# Patient Record
Sex: MALE | Race: WHITE | Employment: OTHER | URBAN - METROPOLITAN AREA
[De-identification: names, ages, dates, MRNs, and addresses within clinical notes are randomized per-mention and may not be internally consistent; named-entity substitution may affect disease eponyms.]

---

## 2017-08-14 ENCOUNTER — HOSPITAL ENCOUNTER (OUTPATIENT)
Dept: GENERAL RADIOLOGY | Age: 72
Discharge: HOME | End: 2017-08-14
Attending: INTERNAL MEDICINE | Admitting: INTERNAL MEDICINE

## 2017-08-14 DIAGNOSIS — R07.2 PRECORDIAL PAIN: ICD-10-CM

## 2022-08-03 ENCOUNTER — HOSPITAL ENCOUNTER (OUTPATIENT)
Dept: NON INVASIVE DIAGNOSTICS | Age: 77
Discharge: HOME | End: 2022-08-03
Payer: MEDICARE

## 2022-08-03 PROCEDURE — 93017 CV STRESS TEST TRACING ONLY: CPT

## 2023-08-02 ENCOUNTER — HOSPITAL ENCOUNTER (OUTPATIENT)
Age: 78
Discharge: HOME | End: 2023-08-02
Payer: MEDICARE

## 2023-08-02 LAB
ANION GAP SERPL CALCULATED.3IONS-SCNC: 16 MMOL/L (ref 4–16)
BUN SERPL-MCNC: 30 MG/DL (ref 6–23)
CALCIUM SERPL-MCNC: 10 MG/DL (ref 8.3–10.6)
CHLORIDE BLD-SCNC: 101 MMOL/L (ref 99–110)
CO2: 21 MMOL/L (ref 21–32)
CREAT SERPL-MCNC: 1.5 MG/DL (ref 0.9–1.3)
GFR SERPL CREATININE-BSD FRML MDRD: 48 ML/MIN/1.73M2
GLUCOSE SERPL-MCNC: 111 MG/DL (ref 70–99)
POTASSIUM SERPL-SCNC: 4.3 MMOL/L (ref 3.5–5.1)
SODIUM BLD-SCNC: 138 MMOL/L (ref 135–145)

## 2023-08-02 PROCEDURE — 36415 COLL VENOUS BLD VENIPUNCTURE: CPT

## 2023-08-02 PROCEDURE — 80048 BASIC METABOLIC PNL TOTAL CA: CPT

## 2024-05-20 ENCOUNTER — HOSPITAL ENCOUNTER (OUTPATIENT)
Dept: RADIATION ONCOLOGY | Age: 79
Discharge: HOME | End: 2024-05-20
Payer: MEDICARE

## 2024-05-20 VITALS
OXYGEN SATURATION: 97 % | HEART RATE: 72 BPM | HEIGHT: 70 IN | SYSTOLIC BLOOD PRESSURE: 139 MMHG | DIASTOLIC BLOOD PRESSURE: 82 MMHG | BODY MASS INDEX: 36.28 KG/M2 | WEIGHT: 253.4 LBS | TEMPERATURE: 98.4 F

## 2024-05-20 DIAGNOSIS — C44.629: Primary | ICD-10-CM

## 2024-05-20 PROCEDURE — 99202 OFFICE O/P NEW SF 15 MIN: CPT | Performed by: RADIOLOGY

## 2024-05-20 PROCEDURE — 99204 OFFICE O/P NEW MOD 45 MIN: CPT | Performed by: RADIOLOGY

## 2024-05-20 RX ORDER — LISINOPRIL 20 MG/1
20 TABLET ORAL DAILY
COMMUNITY
Start: 2023-07-17

## 2024-05-20 RX ORDER — OMEPRAZOLE 40 MG/1
40 CAPSULE, DELAYED RELEASE ORAL DAILY
COMMUNITY
Start: 2022-10-07

## 2024-05-20 RX ORDER — ROSUVASTATIN CALCIUM 40 MG/1
40 TABLET, COATED ORAL DAILY
COMMUNITY
Start: 2023-12-05

## 2024-05-20 ASSESSMENT — PAIN DESCRIPTION - LOCATION: LOCATION: FACE

## 2024-05-20 ASSESSMENT — PAIN SCALES - GENERAL: PAINLEVEL_OUTOF10: 6

## 2024-05-20 NOTE — PLAN OF CARE
Radiation education and handouts given. Side effects and management reviewed with pt. All questions answered and pt voices understanding .    Pt scheduled to return to [facility] for CT/SIM for radiation planning tomorrow 5/21/2024 at 1:00 PM, no prep ordered.     Nursing Care Plan for Skin Radiation    Pain related to cancer diagnosis and treatment.    Interventions   Pain assessment.   Monitor pharmacological pain medication.   Teach relaxation and repositioning techniques.     Expected Outcome   Maintain patient's acceptable pain level or <5 on pain scale.       Knowledge deficit related to diagnosis and treatment plan.    Interventions   Assess patient's ability to comprehend diagnosis and treatment plan.   Provide educational materials and teaching regarding plan of care.   Provide emotional support and continued education.   Refer to psychosocial coordinator if further assistance needed.     Expected Outcome   Patient voices understanding of diagnosis and treatment plan.       Altered skin integrity related to treatment.    Interventions   Evaluation of skin integrity at therapy site.   Advise patient on appropriate skin care.   Instruct patient on recommended lotions/ointments/creams/dressing changes to use on therapy site.     Expected Outcome   Minimize adverse skin reaction/infection at treatment site.       Altered musculo/skeletal/functional status.    Interventions   Assess range of motion and ability to perform ADL's.   Provide assistance when needed.   Referral for OT/PT evaluation and treat.     Expected Outcome   Patient to obtain/maintain highest potential functional level.     Nurse to evaluate weekly during radiation treatments.

## 2024-05-20 NOTE — CONSULTS
production  Cardiovascular:  The patient denies any chest pain, leg edema, or fainting spells  GI:  Patient denies nausea, vomiting, diarrhea, melena, or hematochezia  : Patient denies dysuria, hematuria, or urinary incontinence.  Integumentary: patient denies skin rashes, pruritis, or arm edema  Endocrine:  Patient denies any thyroid problems. Hx DM  Neurologic: Patient denies headaches, focal weakness, or disorientation  Psychiatric: The patient denies any depression, anxiety, or rapid mood swings.    PHYSICAL EXAMINATION:   VITAL SIGNS: /82   Pulse 72   Temp 98.4 °F (36.9 °C) (Infrared)   Ht 1.778 m (5' 10\")   Wt 114.9 kg (253 lb 6.4 oz)   SpO2 97%   BMI 36.36 kg/m²   ECOG PERFORMANCE STATUS: 0  PAIN RATIN    GENERAL: Pleasant well-developed adult in no acute distress.  Alert and oriented ×3  SKIN: Warm and dry, without jaundice, ecchymoses, or petechiae.  HEENT: Normocephalic, atraumatic.  Pupils are round and symmetric. Sclerae anicteric  NECK:  No JVD; Supple. No cervical, supraclavicular, or infraclavicular lymphadenopathy is palpable.  LUNGS: Bilaterally clear to auscultation.  No rales, rhonci, or wheezing are present. Good inspiratory effort, no accessory muscle use.   CARDIAC: Regular rate and rhythm, without murmurs, clicks, or gallops   ABDOMEN: Normoactive bowels sounds are present.  Soft. Non-tender and non-distended.  No hepatosplenomegaly or masses are present.  EXTREMITIES: No cyanosis, clubbing or edema is present.  FROM.  A 2 x 2.5 cm erythematous and scabbed lesion is present along the dorsum of the left index finger  NEUROLOGIC: Gait unremarkable. The patient is alert and oriented.  CN II-XII are grossly intact.  Sensation to light touch is intact and symmetric in the fingers and feet.  Muscle strength is 5/5 in both the upper and lower extremities.      IMPRESSION/PLAN:  Chepe Beyer is a 78 y.o. male who was recently found to have a clinically invasive squamous cell

## 2024-05-20 NOTE — PROGRESS NOTES
Chepe SWIFT Beyer  5/20/2024    CONSULT     Vitals:    05/20/24 0959   BP: 139/82   Pulse: 72   Temp: 98.4 °F (36.9 °C)   SpO2: 97%        Oxygen Therapy  SpO2: 97 %  Pulse Oximetry Type: Intermittent  O2 Device: None (Room air)    Wt Readings from Last 3 Encounters:   05/20/24 114.9 kg (253 lb 6.4 oz)   02/13/14 115.8 kg (255 lb 6 oz)        Pain Assessment  Pain Assessment: 0-10  Pain Level: 6  Pain Location: Face  Denies Need for Intervention    Fall Risk:    Not currently a fall risk      Nutritional Alteration:  None  No Difficulties Swallowing      Mediport: no    Pacemaker/ICD: no    Implants: no    Previous XRT: no    Assessment:       Past Medical History:   Diagnosis Date    Arthritis     [place] (hard of hearing)     Right Ear    Hyperlipidemia     Hypertension     Kidney stones 2000's, 1980's    Passed Kidney Stones Twice    Mood changes     Shingles 2000's    \"Upper Thighs, Buttocks, Crotch Area\"    Skin Cancer In Past    Skin Cancer Excised Face, Neck     Wears glasses        Past Surgical History:   Procedure Laterality Date    APPENDECTOMY  1990's    COLONOSCOPY  Three     Polyps Removed In Past    KNEE ARTHROSCOPY Left 2008    KNEE ARTHROSCOPY Right 2/14/14    medial and lateral menisectomy    SHOULDER ARTHROSCOPY Bilateral 2000 Left    2010 Right    SHOULDER SURGERY Left 2000    SKIN CANCER EXCISION  In Past    Face, Neck    TONSILLECTOMY AND ADENOIDECTOMY  1950's    VASECTOMY  1980's       No Known Allergies       Current Outpatient Medications:     lisinopril (PRINIVIL;ZESTRIL) 20 MG tablet, Take 1 tablet by mouth daily, Disp: , Rfl:     metFORMIN (GLUCOPHAGE) 500 MG tablet, Take 1 tablet by mouth 2 times daily, Disp: , Rfl:     omeprazole (PRILOSEC) 40 MG delayed release capsule, Take 1 capsule by mouth daily, Disp: , Rfl:     rosuvastatin (CRESTOR) 40 MG tablet, Take 1 tablet by mouth daily, Disp: , Rfl:     simvastatin (ZOCOR) 20 MG tablet, Take 1 tablet by mouth every morning, Disp: , Rfl:

## 2024-05-21 ENCOUNTER — HOSPITAL ENCOUNTER (OUTPATIENT)
Dept: RADIATION ONCOLOGY | Age: 79
Discharge: HOME | End: 2024-05-21
Payer: MEDICARE

## 2024-05-21 PROCEDURE — 77334 RADIATION TREATMENT AID(S): CPT | Performed by: RADIOLOGY

## 2024-06-11 ENCOUNTER — HOSPITAL ENCOUNTER (OUTPATIENT)
Dept: RADIATION ONCOLOGY | Age: 79
Discharge: HOME | End: 2024-06-11
Payer: MEDICARE

## 2024-06-11 VITALS — BODY MASS INDEX: 37.11 KG/M2 | WEIGHT: 258.6 LBS

## 2024-06-11 PROCEDURE — 77412 RADIATION TX DELIVERY LVL 3: CPT | Performed by: RADIOLOGY

## 2024-06-11 PROCEDURE — 77280 THER RAD SIMULAJ FIELD SMPL: CPT | Performed by: RADIOLOGY

## 2024-06-11 ASSESSMENT — PAIN SCALES - GENERAL: PAINLEVEL_OUTOF10: 0

## 2024-06-11 NOTE — PROGRESS NOTES
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 Energy good.  No RT complaints      EXAM  Wt Readings from Last 3 Encounters:   06/11/24 117.3 kg (258 lb 9.6 oz)   05/20/24 114.9 kg (253 lb 6.4 oz)   02/13/14 115.8 kg (255 lb 6 oz)     NAD      Setup images, chart, plan reviewed    A/P:   Tolerating RT well  Continue RT as planned  His questions were answered.      Electronically signed by Siobhan Rockwell MD on 6/11/2024 at 2:12 PM

## 2024-06-12 ENCOUNTER — HOSPITAL ENCOUNTER (OUTPATIENT)
Dept: RADIATION ONCOLOGY | Age: 79
Discharge: HOME | End: 2024-06-12
Payer: MEDICARE

## 2024-06-12 PROCEDURE — 77412 RADIATION TX DELIVERY LVL 3: CPT | Performed by: RADIOLOGY

## 2024-06-13 ENCOUNTER — HOSPITAL ENCOUNTER (OUTPATIENT)
Dept: RADIATION ONCOLOGY | Age: 79
Discharge: HOME | End: 2024-06-13
Payer: MEDICARE

## 2024-06-13 PROCEDURE — 77412 RADIATION TX DELIVERY LVL 3: CPT | Performed by: RADIOLOGY

## 2024-06-14 ENCOUNTER — HOSPITAL ENCOUNTER (OUTPATIENT)
Dept: RADIATION ONCOLOGY | Age: 79
Discharge: HOME | End: 2024-06-14
Payer: MEDICARE

## 2024-06-14 PROCEDURE — 77412 RADIATION TX DELIVERY LVL 3: CPT | Performed by: RADIOLOGY

## 2024-06-17 ENCOUNTER — HOSPITAL ENCOUNTER (OUTPATIENT)
Dept: RADIATION ONCOLOGY | Age: 79
Discharge: HOME | End: 2024-06-17
Payer: MEDICARE

## 2024-06-17 ENCOUNTER — APPOINTMENT (OUTPATIENT)
Dept: RADIATION ONCOLOGY | Age: 79
End: 2024-06-17
Payer: MEDICARE

## 2024-06-17 PROCEDURE — 77336 RADIATION PHYSICS CONSULT: CPT | Performed by: RADIOLOGY

## 2024-06-17 PROCEDURE — 77412 RADIATION TX DELIVERY LVL 3: CPT | Performed by: RADIOLOGY

## 2024-06-18 ENCOUNTER — HOSPITAL ENCOUNTER (OUTPATIENT)
Dept: RADIATION ONCOLOGY | Age: 79
Discharge: HOME | End: 2024-06-18
Payer: MEDICARE

## 2024-06-18 ENCOUNTER — APPOINTMENT (OUTPATIENT)
Dept: RADIATION ONCOLOGY | Age: 79
End: 2024-06-18
Payer: MEDICARE

## 2024-06-18 VITALS — BODY MASS INDEX: 37.02 KG/M2 | WEIGHT: 258 LBS

## 2024-06-18 PROCEDURE — 77412 RADIATION TX DELIVERY LVL 3: CPT | Performed by: RADIOLOGY

## 2024-06-18 ASSESSMENT — PAIN SCALES - GENERAL: PAINLEVEL_OUTOF10: 0

## 2024-06-18 NOTE — PROGRESS NOTES
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 Energy good.  No skin itching/soreness.     EXAM  Wt Readings from Last 3 Encounters:   06/11/24 117.3 kg (258 lb 9.6 oz)   05/20/24 114.9 kg (253 lb 6.4 oz)   02/13/14 115.8 kg (255 lb 6 oz)     NAD  No skin erythema    Setup images, chart, plan reviewed    A/P:   Tolerating RT well  Continue RT as planned      Electronically signed by Siobhan Rockwell MD on 6/18/2024 at 1:45 PM

## 2024-06-19 ENCOUNTER — HOSPITAL ENCOUNTER (OUTPATIENT)
Dept: RADIATION ONCOLOGY | Age: 79
Discharge: HOME | End: 2024-06-19
Payer: MEDICARE

## 2024-06-19 ENCOUNTER — APPOINTMENT (OUTPATIENT)
Dept: RADIATION ONCOLOGY | Age: 79
End: 2024-06-19
Payer: MEDICARE

## 2024-06-19 PROCEDURE — 77412 RADIATION TX DELIVERY LVL 3: CPT | Performed by: RADIOLOGY

## 2024-06-20 ENCOUNTER — HOSPITAL ENCOUNTER (OUTPATIENT)
Dept: RADIATION ONCOLOGY | Age: 79
Discharge: HOME | End: 2024-06-20
Payer: MEDICARE

## 2024-06-20 ENCOUNTER — APPOINTMENT (OUTPATIENT)
Dept: RADIATION ONCOLOGY | Age: 79
End: 2024-06-20
Payer: MEDICARE

## 2024-06-20 PROCEDURE — 77412 RADIATION TX DELIVERY LVL 3: CPT | Performed by: RADIOLOGY

## 2024-06-21 ENCOUNTER — APPOINTMENT (OUTPATIENT)
Dept: RADIATION ONCOLOGY | Age: 79
End: 2024-06-21
Payer: MEDICARE

## 2024-06-24 ENCOUNTER — APPOINTMENT (OUTPATIENT)
Dept: RADIATION ONCOLOGY | Age: 79
End: 2024-06-24
Payer: MEDICARE

## 2024-06-24 ENCOUNTER — HOSPITAL ENCOUNTER (OUTPATIENT)
Dept: RADIATION ONCOLOGY | Age: 79
Discharge: HOME | End: 2024-06-24
Payer: MEDICARE

## 2024-06-24 PROCEDURE — 77412 RADIATION TX DELIVERY LVL 3: CPT | Performed by: RADIOLOGY

## 2024-06-25 ENCOUNTER — APPOINTMENT (OUTPATIENT)
Dept: RADIATION ONCOLOGY | Age: 79
End: 2024-06-25
Payer: MEDICARE

## 2024-06-25 ENCOUNTER — HOSPITAL ENCOUNTER (OUTPATIENT)
Dept: RADIATION ONCOLOGY | Age: 79
Discharge: HOME | End: 2024-06-25
Payer: MEDICARE

## 2024-06-25 PROCEDURE — 77336 RADIATION PHYSICS CONSULT: CPT | Performed by: RADIOLOGY

## 2024-06-25 PROCEDURE — 77412 RADIATION TX DELIVERY LVL 3: CPT | Performed by: RADIOLOGY

## 2024-06-26 ENCOUNTER — HOSPITAL ENCOUNTER (OUTPATIENT)
Dept: RADIATION ONCOLOGY | Age: 79
Discharge: HOME | End: 2024-06-26
Payer: MEDICARE

## 2024-06-26 ENCOUNTER — APPOINTMENT (OUTPATIENT)
Dept: RADIATION ONCOLOGY | Age: 79
End: 2024-06-26
Payer: MEDICARE

## 2024-06-26 PROCEDURE — 77412 RADIATION TX DELIVERY LVL 3: CPT | Performed by: RADIOLOGY

## 2024-06-27 ENCOUNTER — HOSPITAL ENCOUNTER (OUTPATIENT)
Dept: RADIATION ONCOLOGY | Age: 79
Discharge: HOME | End: 2024-06-27
Payer: MEDICARE

## 2024-06-27 ENCOUNTER — APPOINTMENT (OUTPATIENT)
Dept: RADIATION ONCOLOGY | Age: 79
End: 2024-06-27
Payer: MEDICARE

## 2024-06-27 PROCEDURE — 77412 RADIATION TX DELIVERY LVL 3: CPT | Performed by: RADIOLOGY

## 2024-06-28 ENCOUNTER — APPOINTMENT (OUTPATIENT)
Dept: RADIATION ONCOLOGY | Age: 79
End: 2024-06-28
Payer: MEDICARE

## 2024-07-01 ENCOUNTER — HOSPITAL ENCOUNTER (OUTPATIENT)
Dept: RADIATION ONCOLOGY | Age: 79
Discharge: HOME | End: 2024-07-01
Payer: MEDICARE

## 2024-07-01 ENCOUNTER — APPOINTMENT (OUTPATIENT)
Dept: RADIATION ONCOLOGY | Age: 79
End: 2024-07-01
Payer: MEDICARE

## 2024-07-01 PROCEDURE — 77412 RADIATION TX DELIVERY LVL 3: CPT | Performed by: RADIOLOGY

## 2024-07-02 ENCOUNTER — APPOINTMENT (OUTPATIENT)
Dept: RADIATION ONCOLOGY | Age: 79
End: 2024-07-02
Payer: MEDICARE

## 2024-07-02 ENCOUNTER — HOSPITAL ENCOUNTER (OUTPATIENT)
Dept: RADIATION ONCOLOGY | Age: 79
Discharge: HOME | End: 2024-07-02
Payer: MEDICARE

## 2024-07-02 VITALS — WEIGHT: 246.4 LBS | BODY MASS INDEX: 35.35 KG/M2

## 2024-07-02 PROCEDURE — 77412 RADIATION TX DELIVERY LVL 3: CPT | Performed by: RADIOLOGY

## 2024-07-02 RX ORDER — TIRZEPATIDE 2.5 MG/.5ML
2.5 INJECTION, SOLUTION SUBCUTANEOUS WEEKLY
COMMUNITY
Start: 2024-05-29

## 2024-07-02 ASSESSMENT — PAIN DESCRIPTION - LOCATION: LOCATION: FINGER (COMMENT WHICH ONE)

## 2024-07-02 ASSESSMENT — PAIN DESCRIPTION - PAIN TYPE: TYPE: ACUTE PAIN

## 2024-07-02 ASSESSMENT — PAIN SCALES - GENERAL: PAINLEVEL_OUTOF10: 2

## 2024-07-02 ASSESSMENT — PAIN DESCRIPTION - FREQUENCY: FREQUENCY: INTERMITTENT

## 2024-07-02 ASSESSMENT — PAIN DESCRIPTION - DESCRIPTORS: DESCRIPTORS: SORE

## 2024-07-02 ASSESSMENT — PAIN - FUNCTIONAL ASSESSMENT: PAIN_FUNCTIONAL_ASSESSMENT: ACTIVITIES ARE NOT PREVENTED

## 2024-07-02 NOTE — PLAN OF CARE
Care plan reviewed.    _ Increased redness in tx area, advised may use Aquaphor PRN,    _ Reports energy down, tolerating ADLs without difficulty.

## 2024-07-02 NOTE — PROGRESS NOTES
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 Energy fairly good.  Mild skin itching/soreness.  Not using any lotions.       EXAM  Wt Readings from Last 3 Encounters:   07/02/24 111.8 kg (246 lb 6.4 oz)   06/18/24 117 kg (258 lb)   06/11/24 117.3 kg (258 lb 9.6 oz)     NAD  Moderate skin erythema without desquamation.    Setup images, chart, plan reviewed    A/P:   Tolerating RT well  Continue RT as planned      Electronically signed by Siobhan Rockwell MD on 7/2/2024 at 2:09 PM

## 2024-07-03 ENCOUNTER — HOSPITAL ENCOUNTER (OUTPATIENT)
Dept: RADIATION ONCOLOGY | Age: 79
Discharge: HOME | End: 2024-07-03
Payer: MEDICARE

## 2024-07-03 ENCOUNTER — APPOINTMENT (OUTPATIENT)
Dept: RADIATION ONCOLOGY | Age: 79
End: 2024-07-03
Payer: MEDICARE

## 2024-07-03 PROCEDURE — 77412 RADIATION TX DELIVERY LVL 3: CPT | Performed by: RADIOLOGY

## 2024-07-03 PROCEDURE — 77336 RADIATION PHYSICS CONSULT: CPT | Performed by: RADIOLOGY

## 2024-07-05 ENCOUNTER — APPOINTMENT (OUTPATIENT)
Dept: RADIATION ONCOLOGY | Age: 79
End: 2024-07-05
Payer: MEDICARE

## 2024-07-08 ENCOUNTER — APPOINTMENT (OUTPATIENT)
Dept: RADIATION ONCOLOGY | Age: 79
End: 2024-07-08
Payer: MEDICARE

## 2024-07-08 ENCOUNTER — HOSPITAL ENCOUNTER (OUTPATIENT)
Dept: RADIATION ONCOLOGY | Age: 79
Discharge: HOME | End: 2024-07-08
Payer: MEDICARE

## 2024-07-08 PROCEDURE — 77412 RADIATION TX DELIVERY LVL 3: CPT | Performed by: RADIOLOGY

## 2024-07-09 ENCOUNTER — APPOINTMENT (OUTPATIENT)
Dept: RADIATION ONCOLOGY | Age: 79
End: 2024-07-09
Payer: MEDICARE

## 2024-07-09 ENCOUNTER — HOSPITAL ENCOUNTER (OUTPATIENT)
Dept: RADIATION ONCOLOGY | Age: 79
Discharge: HOME | End: 2024-07-09
Payer: MEDICARE

## 2024-07-09 PROCEDURE — 77412 RADIATION TX DELIVERY LVL 3: CPT | Performed by: RADIOLOGY

## 2024-07-10 ENCOUNTER — APPOINTMENT (OUTPATIENT)
Dept: RADIATION ONCOLOGY | Age: 79
End: 2024-07-10
Payer: MEDICARE

## 2024-07-10 ENCOUNTER — HOSPITAL ENCOUNTER (OUTPATIENT)
Dept: RADIATION ONCOLOGY | Age: 79
Discharge: HOME | End: 2024-07-10
Payer: MEDICARE

## 2024-07-10 VITALS — WEIGHT: 245 LBS | BODY MASS INDEX: 35.15 KG/M2

## 2024-07-10 PROCEDURE — 77412 RADIATION TX DELIVERY LVL 3: CPT | Performed by: RADIOLOGY

## 2024-07-10 ASSESSMENT — PAIN SCALES - GENERAL: PAINLEVEL_OUTOF10: 0

## 2024-07-10 NOTE — PROGRESS NOTES
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 Energy fairly good.  Some itching/soreness with movement/palpation but otherwise okay no major issues.       EXAM  Wt Readings from Last 3 Encounters:   07/02/24 111.8 kg (246 lb 6.4 oz)   06/18/24 117 kg (258 lb)   06/11/24 117.3 kg (258 lb 9.6 oz)     NAD       chart, plan reviewed    A/P:   Tolerating RT well  Continue RT as planned      Electronically signed by Chaim Quach MD on 7/10/2024 at 1:22 PM

## 2024-07-11 ENCOUNTER — APPOINTMENT (OUTPATIENT)
Dept: RADIATION ONCOLOGY | Age: 79
End: 2024-07-11
Payer: MEDICARE

## 2024-07-11 ENCOUNTER — HOSPITAL ENCOUNTER (OUTPATIENT)
Dept: RADIATION ONCOLOGY | Age: 79
Discharge: HOME | End: 2024-07-11
Payer: MEDICARE

## 2024-07-11 PROCEDURE — 77412 RADIATION TX DELIVERY LVL 3: CPT | Performed by: RADIOLOGY

## 2024-07-12 ENCOUNTER — APPOINTMENT (OUTPATIENT)
Dept: RADIATION ONCOLOGY | Age: 79
End: 2024-07-12
Payer: MEDICARE

## 2024-07-12 ENCOUNTER — HOSPITAL ENCOUNTER (OUTPATIENT)
Dept: RADIATION ONCOLOGY | Age: 79
Discharge: HOME | End: 2024-07-12
Payer: MEDICARE

## 2024-07-12 PROCEDURE — 77412 RADIATION TX DELIVERY LVL 3: CPT | Performed by: RADIOLOGY

## 2024-07-12 PROCEDURE — 77336 RADIATION PHYSICS CONSULT: CPT | Performed by: RADIOLOGY

## 2024-07-15 ENCOUNTER — APPOINTMENT (OUTPATIENT)
Dept: RADIATION ONCOLOGY | Age: 79
End: 2024-07-15
Payer: MEDICARE

## 2024-07-16 ENCOUNTER — APPOINTMENT (OUTPATIENT)
Dept: RADIATION ONCOLOGY | Age: 79
End: 2024-07-16
Payer: MEDICARE

## 2024-07-17 ENCOUNTER — APPOINTMENT (OUTPATIENT)
Dept: RADIATION ONCOLOGY | Age: 79
End: 2024-07-17
Payer: MEDICARE

## 2024-07-18 ENCOUNTER — APPOINTMENT (OUTPATIENT)
Dept: RADIATION ONCOLOGY | Age: 79
End: 2024-07-18
Payer: MEDICARE

## 2024-07-19 ENCOUNTER — APPOINTMENT (OUTPATIENT)
Dept: RADIATION ONCOLOGY | Age: 79
End: 2024-07-19
Payer: MEDICARE

## 2024-07-22 ENCOUNTER — APPOINTMENT (OUTPATIENT)
Dept: RADIATION ONCOLOGY | Age: 79
End: 2024-07-22
Payer: MEDICARE

## 2024-07-23 ENCOUNTER — APPOINTMENT (OUTPATIENT)
Dept: RADIATION ONCOLOGY | Age: 79
End: 2024-07-23
Payer: MEDICARE

## 2024-07-24 ENCOUNTER — APPOINTMENT (OUTPATIENT)
Dept: RADIATION ONCOLOGY | Age: 79
End: 2024-07-24
Payer: MEDICARE

## 2024-07-25 ENCOUNTER — APPOINTMENT (OUTPATIENT)
Dept: RADIATION ONCOLOGY | Age: 79
End: 2024-07-25
Payer: MEDICARE

## 2024-07-26 ENCOUNTER — APPOINTMENT (OUTPATIENT)
Dept: RADIATION ONCOLOGY | Age: 79
End: 2024-07-26
Payer: MEDICARE

## 2024-07-29 ENCOUNTER — APPOINTMENT (OUTPATIENT)
Dept: RADIATION ONCOLOGY | Age: 79
End: 2024-07-29
Payer: MEDICARE

## 2024-08-27 ENCOUNTER — HOSPITAL ENCOUNTER (OUTPATIENT)
Dept: RADIATION ONCOLOGY | Age: 79
Discharge: HOME | End: 2024-08-27

## 2024-08-27 VITALS
OXYGEN SATURATION: 97 % | HEART RATE: 71 BPM | WEIGHT: 231.4 LBS | DIASTOLIC BLOOD PRESSURE: 71 MMHG | SYSTOLIC BLOOD PRESSURE: 98 MMHG | TEMPERATURE: 97.3 F | BODY MASS INDEX: 33.13 KG/M2 | HEIGHT: 70 IN

## 2024-08-27 RX ORDER — TIRZEPATIDE 5 MG/.5ML
5 INJECTION, SOLUTION SUBCUTANEOUS WEEKLY
COMMUNITY
Start: 2024-06-27

## 2024-08-27 ASSESSMENT — PAIN SCALES - GENERAL: PAINLEVEL_OUTOF10: 0

## 2024-08-27 NOTE — PROGRESS NOTES
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, left  Staging form: Cutaneous Squamous Cell Carcinoma Of The Head And Neck, AJCC 8th Edition  - Clinical: Stage I (cT1, cN0, cM0) - Signed by Siobhan Rockwell MD on 2024         TREATMENT COURSE:   Oncology History   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 male who has a history as above who returns today for his first postradiation visit.  Currently, he reports he is doing well.  His energy level is at baseline.  He denies any skin itching or soreness on his left index finger and is currently using a hand lotion as needed.    The patient reports compliance with CDC Covid 19 measures      RADIOLOGIC STUDIES:  XR ANKLE LEFT (MIN 3 VIEWS)    Result Date: 2024  EXAM: XR ANKLE LEFT 3 VIEWS, 2024 09:59 AM COMPARISON: No prior studies available for comparison. CLINICAL INDICATIONS:  left ankle pain RELEVANT CLINICAL HISTORY:  M25.572:Left ankle pain, unspecified chronicity  AP/Mortise/Lateral weight bearing; FINDINGS: 3 weightbearing images obtained. Effusion: There is no joint effusion. Soft Tissue:  possibilities of reaction to medication, bleeding, infection, the need for additional procedures, failure to diagnosis a condition, and creating a complication requiring operation were discussed with the patient. The patient concurred with the proposed plan, giving consent. Preparation: Patient was prepped in the usual sterile fashion. The patient was prepped with chlorhexidine.            LABORATORY STUDIES:   CBC:   Lab Results   Component Value Date/Time    WBC 7.1 02/13/2014 01:52 PM    RBC 5.15 02/13/2014 01:52 PM    HGB 15.1 02/13/2014 01:52 PM    HCT 45.2 02/13/2014 01:52 PM    MCV 87.9 02/13/2014 01:52 PM    MCH 29.3 02/13/2014 01:52 PM    MCHC 33.3 02/13/2014 01:52 PM    RDW 14.1 02/13/2014 01:52 PM     02/13/2014 01:52 PM    MPV 7.3 02/13/2014 01:52 PM     CMP:  Lab Results   Component Value Date/Time     08/02/2023 03:30 PM    K 4.3 08/02/2023 03:30 PM     08/02/2023 03:30 PM    CO2 21 08/02/2023 03:30 PM    BUN 30 08/02/2023 03:30 PM    CREATININE 1.5 08/02/2023 03:30 PM    GFRAA >60 02/13/2014 01:52 PM    LABGLOM 48 08/02/2023 03:30 PM    GLUCOSE 111 08/02/2023 03:30 PM    CALCIUM 10.0 08/02/2023 03:30 PM     Onc labs: No results found for: \"CEA\", \"LDH\", \"AFP\"    MEDICATIONS:   Current Outpatient Medications   Medication Sig Dispense Refill    MOUNJARO 2.5 MG/0.5ML SOPN SC injection Inject 0.5 mLs into the skin once a week      lisinopril (PRINIVIL;ZESTRIL) 20 MG tablet Take 1 tablet by mouth daily      metFORMIN (GLUCOPHAGE) 500 MG tablet Take 1 tablet by mouth 2 times daily      omeprazole (PRILOSEC) 40 MG delayed release capsule Take 1 capsule by mouth daily      rosuvastatin (CRESTOR) 40 MG tablet Take 1 tablet by mouth daily      simvastatin (ZOCOR) 20 MG tablet Take 1 tablet by mouth every morning      hydrochlorothiazide (HYDRODIURIL) 25 MG tablet Take 1 tablet by mouth every morning      escitalopram (LEXAPRO) 10 MG tablet Take 1 tablet by mouth every morning      aspirin 81

## 2024-08-27 NOTE — PROGRESS NOTES
Chepe JAMISON Kayleen  8/27/2024    Patient is seen today for follow up.     Vitals:    08/27/24 1316   BP: 98/71   Pulse: 71   Temp: 97.3 °F (36.3 °C)   SpO2: 97%        Oxygen Therapy  SpO2: 97 %  Pulse Oximeter Device Location: Finger  O2 Device: None (Room air)  Skin Assessment: Clean, dry, & intact  Oxygen Therapy: None (Room air)    Wt Readings from Last 3 Encounters:   08/27/24 105 kg (231 lb 6.4 oz)   07/10/24 111.1 kg (245 lb)   07/02/24 111.8 kg (246 lb 6.4 oz)       Pain Assessment  Pain Assessment: None - Denies Pain  Pain Level: 0  Patient's Stated Pain Goal: 0 - No pain  Multiple Pain Sites: No  Denies Need for Intervention     No Known Allergies     Current Outpatient Medications   Medication Sig Dispense Refill    MOUNJARO 5 MG/0.5ML SOPN SC injection Inject 0.5 mLs into the skin once a week      MOUNJARO 2.5 MG/0.5ML SOPN SC injection Inject 0.5 mLs into the skin once a week      lisinopril (PRINIVIL;ZESTRIL) 20 MG tablet Take 1 tablet by mouth daily      metFORMIN (GLUCOPHAGE) 500 MG tablet Take 1 tablet by mouth 2 times daily      omeprazole (PRILOSEC) 40 MG delayed release capsule Take 1 capsule by mouth daily      rosuvastatin (CRESTOR) 40 MG tablet Take 1 tablet by mouth daily      simvastatin (ZOCOR) 20 MG tablet Take 1 tablet by mouth every morning      hydrochlorothiazide (HYDRODIURIL) 25 MG tablet Take 1 tablet by mouth every morning      escitalopram (LEXAPRO) 10 MG tablet Take 1 tablet by mouth every morning      aspirin 81 MG tablet Take 1 tablet by mouth every morning Over The Counter , Last Dose Taken 2-3-14 Due To Scheduled Surgery      Naproxen Sodium (ALEVE PO) Take  by mouth as needed. Over The Counter       No current facility-administered medications for this encounter.        Additional Comments: Patient denies any changes or concerns to discuss at the present time.     Electronically signed by Pallavi Driver CMA on 8/27/2024 at 1:21 PM

## 2025-04-18 ENCOUNTER — HOSPITAL ENCOUNTER (OUTPATIENT)
Age: 80
Setting detail: SPECIMEN
Discharge: HOME | End: 2025-04-18
Payer: MEDICARE

## 2025-04-18 LAB
ADV 40+41 DNA STL QL NAA+NON-PROBE: NOT DETECTED
C CAYETANENSIS DNA STL QL NAA+NON-PROBE: NOT DETECTED
C COLI+JEJ+UPSA DNA STL QL NAA+NON-PROBE: NOT DETECTED
CRYPTOSP DNA STL QL NAA+NON-PROBE: NOT DETECTED
E COLI O157 DNA STL QL NAA+NON-PROBE: NOT DETECTED
E HISTOLYT DNA STL QL NAA+NON-PROBE: NOT DETECTED
EAEC PAA PLAS AGGR+AATA ST NAA+NON-PRB: NOT DETECTED
EC STX1+STX2 GENES STL QL NAA+NON-PROBE: NOT DETECTED
EPEC EAE GENE STL QL NAA+NON-PROBE: NOT DETECTED
ETEC LTA+ST1A+ST1B TOX ST NAA+NON-PROBE: NOT DETECTED
G LAMBLIA DNA STL QL NAA+NON-PROBE: NOT DETECTED
GI PATH DNA+RNA PNL STL NAA+NON-PROBE: NOT DETECTED
NOROVIRUS GI+II RNA STL QL NAA+NON-PROBE: NOT DETECTED
P SHIGELLOIDES DNA STL QL NAA+NON-PROBE: NOT DETECTED
RVA RNA STL QL NAA+NON-PROBE: NOT DETECTED
S ENT+BONG DNA STL QL NAA+NON-PROBE: NOT DETECTED
SAPO I+II+IV+V RNA STL QL NAA+NON-PROBE: NOT DETECTED
SOURCE: NORMAL
V CHOL+PARA+VUL DNA STL QL NAA+NON-PROBE: NOT DETECTED
V CHOLERAE DNA STL QL NAA+NON-PROBE: NOT DETECTED
Y ENTEROCOL DNA STL QL NAA+NON-PROBE: NOT DETECTED

## 2025-04-18 PROCEDURE — 87507 IADNA-DNA/RNA PROBE TQ 12-25: CPT

## 2025-05-19 ENCOUNTER — HOSPITAL ENCOUNTER (OUTPATIENT)
Dept: RADIATION ONCOLOGY | Age: 80
Discharge: HOME | End: 2025-05-19
Payer: MEDICARE

## 2025-05-19 VITALS
TEMPERATURE: 97.8 F | SYSTOLIC BLOOD PRESSURE: 132 MMHG | HEIGHT: 70 IN | OXYGEN SATURATION: 100 % | HEART RATE: 58 BPM | WEIGHT: 221.8 LBS | DIASTOLIC BLOOD PRESSURE: 78 MMHG | BODY MASS INDEX: 31.75 KG/M2

## 2025-05-19 PROCEDURE — 99213 OFFICE O/P EST LOW 20 MIN: CPT | Performed by: RADIOLOGY

## 2025-05-19 PROCEDURE — 99212 OFFICE O/P EST SF 10 MIN: CPT | Performed by: RADIOLOGY

## 2025-05-19 ASSESSMENT — PAIN SCALES - GENERAL: PAINLEVEL_OUTOF10: 0

## 2025-05-19 NOTE — PROGRESS NOTES
Chepe Beyer  5/19/2025    Patient is seen today for follow up.     Vitals:    05/19/25 1306   BP: 132/78   Pulse: 58   Temp: 97.8 °F (36.6 °C)   SpO2: 100%        Oxygen Therapy  SpO2: 100 %  Pulse Oximeter Device Location: Finger  O2 Device: None (Room air)  Skin Assessment: Clean, dry, & intact  Oxygen Therapy: None (Room air)    Wt Readings from Last 3 Encounters:   05/19/25 100.6 kg (221 lb 12.8 oz)   08/27/24 105 kg (231 lb 6.4 oz)   07/10/24 111.1 kg (245 lb)       Pain Assessment  Pain Assessment: None - Denies Pain  Pain Level: 0  Patient's Stated Pain Goal: 0 - No pain  Denies Need for Intervention     No Known Allergies     Current Outpatient Medications   Medication Sig Dispense Refill    MOUNJARO 5 MG/0.5ML SOPN SC injection Inject 0.5 mLs into the skin once a week      MOUNJARO 2.5 MG/0.5ML SOPN SC injection Inject 0.5 mLs into the skin once a week      lisinopril (PRINIVIL;ZESTRIL) 20 MG tablet Take 1 tablet by mouth daily      metFORMIN (GLUCOPHAGE) 500 MG tablet Take 1 tablet by mouth 2 times daily      omeprazole (PRILOSEC) 40 MG delayed release capsule Take 1 capsule by mouth daily      rosuvastatin (CRESTOR) 40 MG tablet Take 1 tablet by mouth daily      simvastatin (ZOCOR) 20 MG tablet Take 1 tablet by mouth every morning      hydrochlorothiazide (HYDRODIURIL) 25 MG tablet Take 1 tablet by mouth every morning      escitalopram (LEXAPRO) 10 MG tablet Take 1 tablet by mouth every morning      aspirin 81 MG tablet Take 1 tablet by mouth every morning Over The Counter , Last Dose Taken 2-3-14 Due To Scheduled Surgery      Naproxen Sodium (ALEVE PO) Take  by mouth as needed. Over The Counter       No current facility-administered medications for this encounter.        Additional Comments: Patient states no current changes or concerns to discuss.     Electronically signed by Pallavi Driver CMA on 5/19/2025 at 1:07 PM

## 2025-05-19 NOTE — PROGRESS NOTES
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, left  Staging form: Cutaneous Squamous Cell Carcinoma Of The Head And Neck, AJCC 8th Edition  - Clinical: Stage I (cT1, cN0, cM0) - Signed by Siobhan Rockwell MD on 2024         TREATMENT COURSE:   Oncology History   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 male who has a history as above who returns today for routine follow-up visit.  The patient was last seen by me in Aug 2024 and was doing well at that time without evidence of recurrent or metastatic disease.  He reports he saw dermatology approximately 1 month ago and had to spots frozen of the left lower extremity.  He is scheduled to return there in the Fall.    Currently, the patient reports to be doing well.  Energy level has been fairly good overall.  The patient denies any fevers, chills, or drenching night sweats.  Weight and appetite have been stable.  Denies any chest pain or shortness of breath.  Denies any new lumps or bumps anywhere throughout the body.  Denies the new onset of bony pain.  He reports he is wearing a widebrimmed hat and sunscreen when he is outside.      RADIOLOGIC STUDIES:  OCT OPTIC